# Patient Record
Sex: MALE | Race: WHITE | NOT HISPANIC OR LATINO | URBAN - METROPOLITAN AREA
[De-identification: names, ages, dates, MRNs, and addresses within clinical notes are randomized per-mention and may not be internally consistent; named-entity substitution may affect disease eponyms.]

---

## 2020-12-18 ENCOUNTER — OFFICE (OUTPATIENT)
Dept: URBAN - METROPOLITAN AREA CLINIC 11 | Facility: CLINIC | Age: 50
End: 2020-12-18

## 2020-12-18 VITALS
HEIGHT: 69 IN | SYSTOLIC BLOOD PRESSURE: 154 MMHG | WEIGHT: 220 LBS | DIASTOLIC BLOOD PRESSURE: 86 MMHG | HEART RATE: 67 BPM | OXYGEN SATURATION: 99 %

## 2020-12-18 DIAGNOSIS — R94.5 ABNORMAL RESULTS OF LIVER FUNCTION STUDIES: ICD-10-CM

## 2020-12-18 DIAGNOSIS — R13.19 OTHER DYSPHAGIA: ICD-10-CM

## 2020-12-18 DIAGNOSIS — K21.9 GASTRO-ESOPHAGEAL REFLUX DISEASE WITHOUT ESOPHAGITIS: ICD-10-CM

## 2020-12-18 DIAGNOSIS — R19.7 DIARRHEA, UNSPECIFIED: ICD-10-CM

## 2020-12-18 LAB
COMP. METABOLIC PANEL (14): A/G RATIO: 3 — HIGH (ref 1.2–2.2)
COMP. METABOLIC PANEL (14): ALBUMIN: 4.8 G/DL (ref 4–5)
COMP. METABOLIC PANEL (14): ALKALINE PHOSPHATASE: 58 IU/L (ref 39–117)
COMP. METABOLIC PANEL (14): ALT (SGPT): 31 IU/L (ref 0–44)
COMP. METABOLIC PANEL (14): AST (SGOT): 26 IU/L (ref 0–40)
COMP. METABOLIC PANEL (14): BILIRUBIN, TOTAL: 2.7 MG/DL — HIGH (ref 0–1.2)
COMP. METABOLIC PANEL (14): BUN/CREATININE RATIO: 10 (ref 9–20)
COMP. METABOLIC PANEL (14): BUN: 13 MG/DL (ref 6–24)
COMP. METABOLIC PANEL (14): CALCIUM: 9.5 MG/DL (ref 8.7–10.2)
COMP. METABOLIC PANEL (14): CARBON DIOXIDE, TOTAL: 24 MMOL/L (ref 20–29)
COMP. METABOLIC PANEL (14): CHLORIDE: 103 MMOL/L (ref 96–106)
COMP. METABOLIC PANEL (14): CREATININE: 1.35 MG/DL — HIGH (ref 0.76–1.27)
COMP. METABOLIC PANEL (14): EGFR IF AFRICN AM: 71 ML/MIN/1.73 (ref 59–?)
COMP. METABOLIC PANEL (14): EGFR IF NONAFRICN AM: 61 ML/MIN/1.73 (ref 59–?)
COMP. METABOLIC PANEL (14): GLOBULIN, TOTAL: 1.6 G/DL (ref 1.5–4.5)
COMP. METABOLIC PANEL (14): GLUCOSE: 88 MG/DL (ref 65–99)
COMP. METABOLIC PANEL (14): POTASSIUM: 4.3 MMOL/L (ref 3.5–5.2)
COMP. METABOLIC PANEL (14): PROTEIN, TOTAL: 6.4 G/DL (ref 6–8.5)
COMP. METABOLIC PANEL (14): SODIUM: 140 MMOL/L (ref 134–144)
HCV ANTIBODY: HEP C VIRUS AB: <0.1 S/CO RATIO
HEP A AB, TOTAL: NEGATIVE
HEPATITIS B VIRUS (PROFILE VI): HBSAG SCREEN: NEGATIVE
HEPATITIS B VIRUS (PROFILE VI): HEP B CORE AB, IGM: NEGATIVE
HEPATITIS B VIRUS (PROFILE VI): HEP B CORE AB, TOT: NEGATIVE
HEPATITIS B VIRUS (PROFILE VI): HEP B SURFACE AB, QUAL: NON REACTIVE
HEPATITIS B VIRUS (PROFILE VI): HEP BE AB: NEGATIVE
HEPATITIS B VIRUS (PROFILE VI): HEP BE AG: NEGATIVE

## 2020-12-18 PROCEDURE — 99214 OFFICE O/P EST MOD 30 MIN: CPT | Performed by: NURSE PRACTITIONER

## 2020-12-18 RX ORDER — PANTOPRAZOLE SODIUM 20 MG/1
TABLET, DELAYED RELEASE ORAL
Qty: 90 | Refills: 3 | Status: COMPLETED
Start: 2020-12-18 | End: 2021-07-30

## 2020-12-18 RX ORDER — SODIUM SULFATE, POTASSIUM SULFATE, MAGNESIUM SULFATE 17.5; 3.13; 1.6 G/ML; G/ML; G/ML
SOLUTION, CONCENTRATE ORAL
Qty: 1 | Refills: 0 | Status: COMPLETED
Start: 2020-12-18 | End: 2021-01-14

## 2020-12-18 NOTE — SERVICEHPINOTES
Mr. Jha is a 50 year old male that presents today for elevated liver enzymes. He has a history of colon polyps, elevated bilirubin, gastric ulcer,  He notes that he had elevation of bilirubin and liver enzymes. He notes that he has had chronic elevation of the bilirubin when he was young. He notes that his urine is typically dark unless he is drinking a lot of water. He denies noticing any jaundice. He denies spider telangiectasias. He denies ankle swelling. He notes that his is often tired. He notes that he has had swelling on the right side of the abdomen. He notes that it has been this way for two years. He notes that he drinks once a week. At this time he drinks 4-5 drinks of whiskey at that time. He notes that he drinks a high amount of protein mixes, creatine, pre-work out drinks.FONT style="BACKGROUND-COLOR: #ffffcc" visited="true"BRFONT style="BACKGROUND-COLOR: #dddddd" visited="true"Elzbieta has a history of GERD. He is taking OTC Nexium daily. He notes he does have regular reflux a few times per week. Aggravating factors include water flavor packets and coffee. Alleviating factors include taking Nexium. He notes that he has esophageal dysphagia. He notes that the last large meal he ate it felt as if his food got stuck in the chest area, and he would have to regurgitate the food back up. He notes that this has been occurring for about a few weeks. No particular aggravating foods. He has been on Meloxicam for several years, but he has stopped taking this medication since he was told that his creatinine was slightly elevated.Last EGD in 2014 with mild intestinal metaplasia in the stomachHe notes that he has a regular bowel movement in the morning. He notes that he has diarrhea after eating. He notes that this has been a problem ever since he had his gall bladder removed. He notes that he has a few stools per day, but this depends on how many meals he eats. Stool is liquid brown. He notes that he used to eat fiber cereal, and this would improve the liquid bowel movements. He denies hematochezia.  Last colonoscopy in 2004 with a tubular adenoma that was 0.7cm in ascending colonBR/FONTBR/FONT

## 2020-12-18 NOTE — SERVICENOTES
We discussed differentials for elevated liver enzymes including fatty liver disease, alcohol related, autoimmune, medication induced. Will get labs today and obtain recent las from PCP and US for [facility]. 

With his history of GERD frequently, being on Meloxicam regularly, and dew onset dysphagia need to get EGD with possible dilation with r/b/a. Also will start prescription strength Nexium. Discussed red flags with the patient including nausea/vomiting, weight loss, hemoptysis, and melena. 

We discussed his diarrhea after eating. Since he notes that this occurs ever since his gall bladder was removed and it was improved with fiber cereal will start him on fiber supplementation after his EGD. If this does not improve will try Colestid. He is not wanting to do a colonoscopy at this time.


Pt was seen and evalauted.  Case was d/w Lalita DE LEON.  I agree with the above evaluation and plan as outlined.  He has had issues with reflux for about 8 years and issues with dysphagia to solid foods for a "couple of years".  He stated that he had the dysphagia years ago but after a few years it resolved and has now had recurrent issues.  We discussed the use of the Protonix, a dif dx of the dyspyhagia and evaluation/tx by EGD with possible dilation.  He has a hx of colon polyps and is due for his f/u colonoscopy.  As to his abnormal LFTS, we have requestes his records.  We can start with serologies and depending on the results make further recs.  We discussed NSAIDs, risks of gastritis/ulcerations, and use of PPIs.

## 2021-01-14 ENCOUNTER — AMBULATORY SURGICAL CENTER (OUTPATIENT)
Dept: URBAN - METROPOLITAN AREA SURGERY 3 | Facility: SURGERY | Age: 51
End: 2021-01-14

## 2021-01-14 ENCOUNTER — OFFICE (OUTPATIENT)
Dept: URBAN - METROPOLITAN AREA PATHOLOGY 22 | Facility: PATHOLOGY | Age: 51
End: 2021-01-14

## 2021-01-14 ENCOUNTER — AMBULATORY SURGICAL CENTER (OUTPATIENT)
Dept: URBAN - METROPOLITAN AREA SURGERY 3 | Facility: SURGERY | Age: 51
End: 2021-01-14
Payer: COMMERCIAL

## 2021-01-14 VITALS
OXYGEN SATURATION: 92 % | DIASTOLIC BLOOD PRESSURE: 80 MMHG | TEMPERATURE: 96.4 F | DIASTOLIC BLOOD PRESSURE: 68 MMHG | TEMPERATURE: 96.4 F | TEMPERATURE: 96.4 F | OXYGEN SATURATION: 96 % | DIASTOLIC BLOOD PRESSURE: 80 MMHG | DIASTOLIC BLOOD PRESSURE: 68 MMHG | HEIGHT: 69 IN | TEMPERATURE: 97.2 F | OXYGEN SATURATION: 96 % | HEART RATE: 80 BPM | SYSTOLIC BLOOD PRESSURE: 117 MMHG | SYSTOLIC BLOOD PRESSURE: 125 MMHG | SYSTOLIC BLOOD PRESSURE: 133 MMHG | SYSTOLIC BLOOD PRESSURE: 115 MMHG | RESPIRATION RATE: 20 BRPM | SYSTOLIC BLOOD PRESSURE: 124 MMHG | DIASTOLIC BLOOD PRESSURE: 68 MMHG | WEIGHT: 215 LBS | OXYGEN SATURATION: 96 % | HEIGHT: 69 IN | RESPIRATION RATE: 16 BRPM | DIASTOLIC BLOOD PRESSURE: 62 MMHG | DIASTOLIC BLOOD PRESSURE: 87 MMHG | HEIGHT: 69 IN | SYSTOLIC BLOOD PRESSURE: 133 MMHG | OXYGEN SATURATION: 92 % | HEART RATE: 80 BPM | SYSTOLIC BLOOD PRESSURE: 124 MMHG | HEART RATE: 83 BPM | RESPIRATION RATE: 20 BRPM | SYSTOLIC BLOOD PRESSURE: 124 MMHG | HEART RATE: 83 BPM | SYSTOLIC BLOOD PRESSURE: 117 MMHG | RESPIRATION RATE: 18 BRPM | OXYGEN SATURATION: 92 % | DIASTOLIC BLOOD PRESSURE: 84 MMHG | WEIGHT: 215 LBS | SYSTOLIC BLOOD PRESSURE: 125 MMHG | RESPIRATION RATE: 12 BRPM | DIASTOLIC BLOOD PRESSURE: 62 MMHG | SYSTOLIC BLOOD PRESSURE: 133 MMHG | HEART RATE: 83 BPM | RESPIRATION RATE: 18 BRPM | OXYGEN SATURATION: 94 % | OXYGEN SATURATION: 94 % | OXYGEN SATURATION: 95 % | RESPIRATION RATE: 18 BRPM | OXYGEN SATURATION: 94 % | RESPIRATION RATE: 12 BRPM | RESPIRATION RATE: 12 BRPM | HEART RATE: 80 BPM | RESPIRATION RATE: 16 BRPM | SYSTOLIC BLOOD PRESSURE: 117 MMHG | DIASTOLIC BLOOD PRESSURE: 84 MMHG | SYSTOLIC BLOOD PRESSURE: 125 MMHG | HEART RATE: 85 BPM | SYSTOLIC BLOOD PRESSURE: 115 MMHG | DIASTOLIC BLOOD PRESSURE: 62 MMHG | DIASTOLIC BLOOD PRESSURE: 84 MMHG | TEMPERATURE: 97.2 F | OXYGEN SATURATION: 95 % | DIASTOLIC BLOOD PRESSURE: 87 MMHG | RESPIRATION RATE: 16 BRPM | TEMPERATURE: 97.2 F | SYSTOLIC BLOOD PRESSURE: 115 MMHG | HEART RATE: 85 BPM | WEIGHT: 215 LBS | HEART RATE: 85 BPM | RESPIRATION RATE: 20 BRPM | DIASTOLIC BLOOD PRESSURE: 80 MMHG | OXYGEN SATURATION: 95 % | DIASTOLIC BLOOD PRESSURE: 87 MMHG

## 2021-01-14 DIAGNOSIS — K22.2 ESOPHAGEAL OBSTRUCTION: ICD-10-CM

## 2021-01-14 DIAGNOSIS — K31.89 OTHER DISEASES OF STOMACH AND DUODENUM: ICD-10-CM

## 2021-01-14 DIAGNOSIS — K57.30 DIVERTICULOSIS OF LARGE INTESTINE WITHOUT PERFORATION OR ABS: ICD-10-CM

## 2021-01-14 DIAGNOSIS — K21.9 GASTRO-ESOPHAGEAL REFLUX DISEASE WITHOUT ESOPHAGITIS: ICD-10-CM

## 2021-01-14 DIAGNOSIS — K44.9 DIAPHRAGMATIC HERNIA WITHOUT OBSTRUCTION OR GANGRENE: ICD-10-CM

## 2021-01-14 DIAGNOSIS — D12.3 BENIGN NEOPLASM OF TRANSVERSE COLON: ICD-10-CM

## 2021-01-14 DIAGNOSIS — Z86.010 PERSONAL HISTORY OF COLONIC POLYPS: ICD-10-CM

## 2021-01-14 PROBLEM — K63.5 POLYP OF COLON: Status: ACTIVE | Noted: 2021-01-14

## 2021-01-14 PROBLEM — K22.8 OTHER SPECIFIED DISEASES OF ESOPHAGUS: Status: ACTIVE | Noted: 2021-01-14

## 2021-01-14 PROCEDURE — 88313 SPECIAL STAINS GROUP 2: CPT | Performed by: INTERNAL MEDICINE

## 2021-01-14 PROCEDURE — 43239 EGD BIOPSY SINGLE/MULTIPLE: CPT | Mod: 51 | Performed by: INTERNAL MEDICINE

## 2021-01-14 PROCEDURE — 45380 COLONOSCOPY AND BIOPSY: CPT | Mod: 33 | Performed by: INTERNAL MEDICINE

## 2021-01-14 PROCEDURE — 88305 TISSUE EXAM BY PATHOLOGIST: CPT | Performed by: INTERNAL MEDICINE

## 2021-01-14 PROCEDURE — 88342 IMHCHEM/IMCYTCHM 1ST ANTB: CPT | Performed by: INTERNAL MEDICINE

## 2021-01-14 PROCEDURE — 43450 DILATE ESOPHAGUS 1/MULT PASS: CPT | Mod: 51 | Performed by: INTERNAL MEDICINE

## 2021-01-14 RX ORDER — ESOMEPRAZOLE MAGNESIUM 40 MG/1
40 CAPSULE, DELAYED RELEASE ORAL
Qty: 90 | Refills: 3 | Status: ACTIVE

## 2021-07-27 ENCOUNTER — OFFICE (OUTPATIENT)
Dept: URBAN - METROPOLITAN AREA CLINIC 11 | Facility: CLINIC | Age: 51
End: 2021-07-27

## 2021-07-27 PROCEDURE — G9199 DOC REASON FOR NO VTE: HCPCS | Performed by: NURSE PRACTITIONER

## 2021-07-30 ENCOUNTER — OFFICE (OUTPATIENT)
Dept: URBAN - METROPOLITAN AREA CLINIC 11 | Facility: CLINIC | Age: 51
End: 2021-07-30

## 2021-07-30 VITALS
OXYGEN SATURATION: 98 % | HEIGHT: 69 IN | DIASTOLIC BLOOD PRESSURE: 74 MMHG | HEART RATE: 82 BPM | WEIGHT: 208 LBS | SYSTOLIC BLOOD PRESSURE: 130 MMHG

## 2021-07-30 DIAGNOSIS — R07.89 OTHER CHEST PAIN: ICD-10-CM

## 2021-07-30 DIAGNOSIS — R10.13 EPIGASTRIC PAIN: ICD-10-CM

## 2021-07-30 DIAGNOSIS — K21.9 GASTRO-ESOPHAGEAL REFLUX DISEASE WITHOUT ESOPHAGITIS: ICD-10-CM

## 2021-07-30 PROCEDURE — 99214 OFFICE O/P EST MOD 30 MIN: CPT | Performed by: NURSE PRACTITIONER

## 2021-07-30 RX ORDER — FAMOTIDINE 40 MG/1
40 TABLET, FILM COATED ORAL
Qty: 30 | Refills: 5 | Status: ACTIVE
Start: 2021-07-30

## 2021-07-30 NOTE — SERVICENOTES
Discussed differentials including enlargement of his hiatal hernia, compression of diaphragm when he bends over etc.. Will get Upper GI to assess hiatal hernia. With symptoms upon  awakening, can try starting him on Pepcid at bedtime to see if these symptoms improve. Continue PPI at current dose.

## 2023-06-27 NOTE — SERVICEHPINOTES
Mr. Jha is a 50 year old male that presents today for follow up. He notes he has a hard time breathing when he bends over to tie his shoes or wash his feet in the shower. He notes it feels tight in the chest area. He notes that when he presses against the area it makes it a little harder to breathe. No other associated symptoms. He denies heart palpitations, dizziness, and JIMENEZ. He notes he has intermittent stomach cramps. He notes it occurs after eating foods that are spicy. No specific aggravating factors. Severity is mild to moderate. He notes that he takes Nexium with good control of his heartburn. He notes he wakes up with nausea/belching in the mornings. He notes it occurs 3-4 times per week. He is no longer taking supplements for working out.. It has been a few weeks since he has taken anything. 01/14/21:EGD- sliding small hiatal hernia, Schatzki's ring in GE junction (dilated), benign gastritis.He notes that he had his heart checked last year with negative findings. BR Detail Level: Zone